# Patient Record
Sex: MALE | NOT HISPANIC OR LATINO | RURAL
[De-identification: names, ages, dates, MRNs, and addresses within clinical notes are randomized per-mention and may not be internally consistent; named-entity substitution may affect disease eponyms.]

---

## 2018-02-19 VITALS — HEIGHT: 72 IN

## 2018-02-19 RX ORDER — ROSUVASTATIN CALCIUM 5 MG/1
5 TABLET, COATED ORAL DAILY
COMMUNITY

## 2018-02-19 RX ORDER — FENOFIBRATE 48 MG/1
48 TABLET, COATED ORAL DAILY
COMMUNITY

## 2018-02-19 RX ORDER — BUPROPION HYDROCHLORIDE 100 MG/1
100 TABLET ORAL 2 TIMES DAILY
COMMUNITY

## 2018-02-19 RX ORDER — CETIRIZINE HYDROCHLORIDE 10 MG/1
10 TABLET ORAL DAILY
COMMUNITY

## 2018-02-22 ENCOUNTER — OFFICE VISIT (OUTPATIENT)
Dept: CARDIOLOGY | Facility: CLINIC | Age: 53
End: 2018-02-22

## 2018-02-22 VITALS
HEART RATE: 78 BPM | BODY MASS INDEX: 28.44 KG/M2 | WEIGHT: 210 LBS | DIASTOLIC BLOOD PRESSURE: 98 MMHG | SYSTOLIC BLOOD PRESSURE: 142 MMHG | HEIGHT: 72 IN

## 2018-02-22 DIAGNOSIS — I10 ESSENTIAL HYPERTENSION: ICD-10-CM

## 2018-02-22 DIAGNOSIS — R00.2 PALPITATIONS: Primary | ICD-10-CM

## 2018-02-22 DIAGNOSIS — R07.89 OTHER CHEST PAIN: ICD-10-CM

## 2018-02-22 PROCEDURE — 99204 OFFICE O/P NEW MOD 45 MIN: CPT | Performed by: INTERNAL MEDICINE

## 2018-02-22 PROCEDURE — 93000 ELECTROCARDIOGRAM COMPLETE: CPT | Performed by: INTERNAL MEDICINE

## 2018-02-22 NOTE — PROGRESS NOTES
Subjective:     Encounter Date:2018      Patient ID: Kalen Fournier is a 52 y.o. male.    Chief Complaint: HTN, palpitations, CP    History of Present Illness    Mr. Kalen Fournier is a trey 52-year-old man who works as a  and also the local .  He comes in for evaluation of chest pain, palpitations and hypertension.      He states that he is normally in good health.  He had an episode of palpitations about three years ago that was evaluated with a Holter monitor.  This demonstrated PVCs.  Back then, he exercised and was in much better shape.  His symptoms resolved.      About a month ago he redeveloped symptoms of palpitations.  He describes them as a few second of irregular heartbeat associated with some mild chest discomfort.  They happen dozens of times during the day but never last more than a few seconds.      Associated with this has been hypertension.  His diastolic blood pressure has been running in the 90s.  He stopped his blood pressure medication three years ago when it was no longer necessary.  He now thinks his blood pressure is elevated all the time.      He states that the most likely cause of this is poor health habits.  He has been overworked due to his job.  He states he is under a great amount of sleep and has been sleeping only about three or four hours a night.  He has not been exercising in three years.          Review of Systems   All other systems reviewed and are negative.    Family History   Problem Relation Age of Onset   • Sudden death Maternal Uncle    • Stroke Paternal Grandfather    • Hyperlipidemia Neg Hx    • Hypertension Neg Hx      Social History   Substance Use Topics   • Smoking status: Former Smoker   • Smokeless tobacco: Not on file   • Alcohol use Yes      Comment: occasional         ECG 12 Lead  Date/Time: 2018 9:21 AM  Performed by: ELIS GUSMAN  Authorized by: ELIS GUSMAN   Previous ECG: no previous ECG available  Rhythm: sinus rhythm  BPM:  78  Clinical impression: normal ECG               Objective:     Physical Exam   Constitutional: He is oriented to person, place, and time. He appears well-developed and well-nourished.   HENT:   Head: Normocephalic and atraumatic.   Neck: Normal range of motion. Neck supple.   Cardiovascular: Normal rate, regular rhythm and normal heart sounds.    Pulmonary/Chest: Effort normal and breath sounds normal.   Abdominal: Soft. Bowel sounds are normal.   Musculoskeletal: Normal range of motion.   Neurological: He is alert and oriented to person, place, and time.   Skin: Skin is warm and dry.   Psychiatric: He has a normal mood and affect. His behavior is normal. Thought content normal.   Vitals reviewed.      Lab Review:       Assessment:          Diagnosis Plan   1. Palpitations     2. Other chest pain     3. Essential hypertension            Plan:       It was a pleasure to see your patient in cardiac evaluation today.  He is a trey 52-year-old man who appears to be in very good health.  He does have a family history of coronary disease and a prior history of hypertension and palpitations.  I suspect that he is having increasing PVCs primarily due to lifestyle.  We talked about lifestyle modifications that could help him.  He seems committed to getting back into exercise.      I offered him some diagnostic testing for reassurance, but we both agreed that it is probably not necessary if he is going to make lifestyle changes.  Certainly if his symptoms worsen despite his efforts, then he should return for further evaluation.      I talked about adding a beta blocker for hypertension and palpitation suppression but he hopes he can get this under control without medication.      He will contact me in the future if he needs any assistance.